# Patient Record
Sex: FEMALE | Race: BLACK OR AFRICAN AMERICAN | NOT HISPANIC OR LATINO | Employment: OTHER | URBAN - METROPOLITAN AREA
[De-identification: names, ages, dates, MRNs, and addresses within clinical notes are randomized per-mention and may not be internally consistent; named-entity substitution may affect disease eponyms.]

---

## 2017-12-29 ENCOUNTER — OFFICE VISIT (OUTPATIENT)
Dept: INTERNAL MEDICINE | Facility: CLINIC | Age: 72
End: 2017-12-29
Payer: COMMERCIAL

## 2017-12-29 VITALS
TEMPERATURE: 98 F | HEART RATE: 80 BPM | WEIGHT: 170.31 LBS | BODY MASS INDEX: 28.37 KG/M2 | OXYGEN SATURATION: 98 % | DIASTOLIC BLOOD PRESSURE: 72 MMHG | HEIGHT: 65 IN | RESPIRATION RATE: 16 BRPM | SYSTOLIC BLOOD PRESSURE: 138 MMHG

## 2017-12-29 DIAGNOSIS — E87.1 HYPONATREMIA: Primary | ICD-10-CM

## 2017-12-29 DIAGNOSIS — R25.2 CRAMP AND SPASM: ICD-10-CM

## 2017-12-29 PROCEDURE — 99999 PR PBB SHADOW E&M-NEW PATIENT-LVL V: CPT | Mod: PBBFAC,,, | Performed by: PHYSICIAN ASSISTANT

## 2017-12-29 PROCEDURE — 99203 OFFICE O/P NEW LOW 30 MIN: CPT | Mod: S$GLB,,, | Performed by: PHYSICIAN ASSISTANT

## 2017-12-29 RX ORDER — AMLODIPINE BESYLATE 5 MG/1
5 TABLET ORAL DAILY PRN
COMMUNITY
Start: 2017-10-02 | End: 2019-06-18

## 2017-12-29 RX ORDER — HYDROQUINONE 40 MG/G
1 CREAM TOPICAL
COMMUNITY
Start: 2017-10-02 | End: 2019-06-18

## 2017-12-29 RX ORDER — ASPIRIN 81 MG/1
81 TABLET ORAL DAILY
COMMUNITY

## 2017-12-29 RX ORDER — SPIRONOLACTONE 25 MG/1
1 TABLET ORAL DAILY PRN
COMMUNITY
Start: 2017-07-31 | End: 2018-02-19

## 2017-12-29 RX ORDER — CLONIDINE HYDROCHLORIDE 0.1 MG/1
0.1 TABLET ORAL 2 TIMES DAILY PRN
COMMUNITY
Start: 2017-05-10 | End: 2019-06-18

## 2017-12-29 RX ORDER — LISINOPRIL 10 MG/1
1 TABLET ORAL DAILY
COMMUNITY
Start: 2017-10-02 | End: 2019-06-18

## 2017-12-29 RX ORDER — LORAZEPAM 1 MG/1
0.5 TABLET ORAL 2 TIMES DAILY PRN
COMMUNITY
Start: 2017-12-26

## 2017-12-29 NOTE — PROGRESS NOTES
Subjective:       Patient ID: Susy Betts is a 72 y.o.B/ female.    Chief Complaint: Anorexia; Weight Loss; and Hair Loss    HPI         The patient is new to [facility] today and comes in by herself.  She has the above problem.  She's convinced that she has some form of [place]'s disease because she has all the symptoms ago along with it.  She has been seeing her own family practitioner Dr. Jay Guadarrama M.D. for about 15 years in [place].  I think he is finally told her that she really does not have a problem and she needs to get off the thought of having [place]'s.  She keeps complaining that her sodium has been chronically low and she brought in copies of her labs to show that it has been happening since 2014.  She also complains of little black freckles at her hairline on her forehead, chloasma on her cheeks, cramps that are occurring all over her body almost daily which include her mid finger on the right hand, her right side and flank, her right and left thigh, and her right calf.  She has also seen an endocrinologist and a nephrologist and got the same response as her family practitioner.  They all say she is trying to make something out a nothing.    Review of Systems    Otherwise negative concerning the ENDOCRINE, RENAL, PULMONARY, CV, VASCULAR, and GI system review.    Objective:      Physical Exam    She has extremely young-looking skin for her age on her face.  I don't really see any hyperpigmented freckles anywhere on her face but she is wearing heavy makeup.  She also doesn't show any signs of chloasma.  ENT: All within normal limits.  CHEST: Clear BS anterior to posterior.  She has no wheeze, rhonchi, or rales.  HEART: RSR.  Pulse rate is 78 bpm and regular.  S1 is greater than S2.  There is no murmur or gallop.  She doesn't have any peripheral edema.  EXTREMITIES: She has no muscle contraction deformities anywhere in her fingers, arms, or legs.    Assessment:       1. Hyponatremia    2. Cramp  and spasm        Plan:     1.  I put a lot of copies of recent labs into the EMR today.  Because of those studies that have recently been done, we don't need to repeat any them at this time.  2.  Refer to nephrology to see Dr. Carreno concerning her hyponatremia.

## 2018-02-19 ENCOUNTER — OFFICE VISIT (OUTPATIENT)
Dept: NEPHROLOGY | Facility: CLINIC | Age: 73
End: 2018-02-19
Payer: COMMERCIAL

## 2018-02-19 VITALS
HEART RATE: 82 BPM | DIASTOLIC BLOOD PRESSURE: 80 MMHG | BODY MASS INDEX: 28.65 KG/M2 | WEIGHT: 171.94 LBS | SYSTOLIC BLOOD PRESSURE: 140 MMHG | HEIGHT: 65 IN

## 2018-02-19 DIAGNOSIS — I10 ESSENTIAL HYPERTENSION: ICD-10-CM

## 2018-02-19 DIAGNOSIS — E83.52 HYPERCALCEMIA: Primary | ICD-10-CM

## 2018-02-19 DIAGNOSIS — E87.1 HYPONATREMIA: ICD-10-CM

## 2018-02-19 PROCEDURE — 3008F BODY MASS INDEX DOCD: CPT | Mod: S$GLB,,, | Performed by: INTERNAL MEDICINE

## 2018-02-19 PROCEDURE — 1125F AMNT PAIN NOTED PAIN PRSNT: CPT | Mod: S$GLB,,, | Performed by: INTERNAL MEDICINE

## 2018-02-19 PROCEDURE — 99204 OFFICE O/P NEW MOD 45 MIN: CPT | Mod: S$GLB,,, | Performed by: INTERNAL MEDICINE

## 2018-02-19 PROCEDURE — 1159F MED LIST DOCD IN RCRD: CPT | Mod: S$GLB,,, | Performed by: INTERNAL MEDICINE

## 2018-02-19 PROCEDURE — 99999 PR PBB SHADOW E&M-EST. PATIENT-LVL III: CPT | Mod: PBBFAC,,, | Performed by: INTERNAL MEDICINE

## 2018-02-19 PROCEDURE — 99354 PR PROLONGED SVC, OUPT, 1ST HR: CPT | Mod: S$GLB,,, | Performed by: INTERNAL MEDICINE

## 2018-02-19 NOTE — PROGRESS NOTES
Subjective:       Patient ID: Susy Betts is a 72 y.o. 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  Has been followed by endocrinology Dr. Reyes for possible [place]'s disease.  Patient has had a negative workup for adrenal gland abnormalities including cosyntropin test.  Patient also had metanephrines and other blood pressure workup done already including multiple scans of the body including renal ultrasound and MRI.    February 2018 patient comes in with 30 pound weight loss, history of acne, hair loss and has been using Aldactone for 5 years.  Patient has chronic hyponatremia with sodium ranging between 125 and 133.  Also has chronic hypercalcemia with calcium ranging between 10.4 and 10.9.    Review of Systems   Constitutional: Positive for unexpected weight change. Negative for activity change, appetite change, chills, diaphoresis, fatigue and fever.   HENT: Negative for congestion, dental problem, drooling, postnasal drip, rhinorrhea and voice change.    Eyes: Negative for discharge.   Respiratory: Negative for apnea, cough, choking, chest tightness, shortness of breath, wheezing and stridor.    Cardiovascular: Negative for chest pain, palpitations and leg swelling.   Gastrointestinal: Negative for abdominal distention, blood in stool, constipation, diarrhea, nausea, rectal pain and vomiting.   Endocrine: Negative for cold intolerance, heat intolerance, polydipsia and polyuria.   Genitourinary: Negative for decreased urine volume, difficulty urinating, dysuria, enuresis, flank pain, frequency, hematuria and urgency.   Musculoskeletal: Negative for arthralgias, back pain, gait problem and joint swelling.        Right knee pain   Skin: Negative for rash.   Allergic/Immunologic: Negative for food allergies and immunocompromised state.   Neurological: Negative for  "dizziness, tremors, syncope, numbness and headaches.   Hematological: Does not bruise/bleed easily.   Psychiatric/Behavioral: Negative for agitation, behavioral problems and self-injury. The patient is not nervous/anxious and is not hyperactive.    All other systems reviewed and are negative.      Objective:   BP (!) 140/80   Pulse 82   Ht 5' 5" (1.651 m)   Wt 78 kg (171 lb 15.3 oz)   BMI 28.62 kg/m²      Physical Exam   Constitutional: She is oriented to person, place, and time. No distress.   HENT:   Head: Normocephalic and atraumatic.   Nose: Nose normal.   Eyes: Conjunctivae and EOM are normal. Pupils are equal, round, and reactive to light.   Neck: Normal range of motion. No JVD present. No tracheal deviation present. No thyromegaly present.   Cardiovascular: Normal rate, regular rhythm, normal heart sounds and intact distal pulses.  Exam reveals no gallop and no friction rub.    No murmur heard.  Pulmonary/Chest: Effort normal and breath sounds normal. No respiratory distress. She has no wheezes. She has no rales. She exhibits no tenderness.   Abdominal: Soft. Bowel sounds are normal. She exhibits no distension and no mass. There is no tenderness. No hernia.   Musculoskeletal: She exhibits no edema, tenderness or deformity.        Right knee: She exhibits decreased range of motion and swelling.   Neurological: She is alert and oriented to person, place, and time. She has normal reflexes. She displays normal reflexes. No cranial nerve deficit. She exhibits normal muscle tone. Coordination normal.   Skin: Skin is warm. She is not diaphoretic. No erythema. There is pallor.   Psychiatric: She has a normal mood and affect. Her behavior is normal. Judgment and thought content normal.   Nursing note and vitals reviewed.          Assessment:    )    1. Hypercalcemia    2. Hyponatremia    3. Essential hypertension        Plan:         1.  Patient has hypercalcemia which is chronic: Differential diagnosis and workup " discussed in with the patient in great detail.  Algorithm also discussed and given a copy to the patient.  We'll check PTH, vitamin D level and serum protein at the pheresis and screen for multiple myeloma ensued and the fact the patient has weight loss in addition to chronic hypocalcemia.  Patient also has been running low blood pressures lately.    2.  Hyponatremia: I have advised the patient to stop the Aldactone and recheck sodium in a few weeks.  If the sodium is still low then we will start the workup.  I will get also given her literature search which shows case reports on Aldactone causing hyponatremia     3.  Hypertension: Considering the relatively low blood pressure avoid clonidine.  Avoid hypotension.  No evidence of secondary causes of hypertension.  Workup by Dr. Rohit Reyes in endocrinology reviewed in great detail.      Extended time was 30 minutes which was spent face-to-face with the patient .  Greater than 70% of the extended time was spent in discussing all of the complex medical problems   and discussing the therapeutic options regarding all of the above complex medical problems.  Follow-up one month

## 2018-02-19 NOTE — LETTER
February 19, 2018      Mark Anthony Hall PA-C  [address] [facility] [place] [address]           [facility] - Nephrology  [address] [facility] [place] [address]  Phone: [phone]  Fax: [phone]          Patient: Susy Betts   MR Number: [id number]   YOB: 1945   Date of Visit: 2/19/2018       Dear Mark Anthony Hall:    Thank you for referring Susy Betts to me for evaluation. Attached you will find relevant portions of my assessment and plan of care.    If you have questions, please do not hesitate to call me. I look forward to following Susy Betts along with you.    Sincerely,    Dennys Carreno MD    Enclosure  CC:  No Recipients    If you would like to receive this communication electronically, please contact [email or web address] or [phone] to request more information on [facility] access.    For providers and/or their staff who would like to refer a patient to [facility], please contact us through our one-stop-shop provider referral line, [facility], at [phone].    If you feel you have received this communication in error or would no longer like to receive these types of communications, please e-mail [email or web address]

## 2018-04-16 ENCOUNTER — TELEPHONE (OUTPATIENT)
Dept: NEPHROLOGY | Facility: CLINIC | Age: 73
End: 2018-04-16

## 2018-04-16 NOTE — TELEPHONE ENCOUNTER
----- Message from [place] Engle sent at 4/13/2018  4:23 PM CDT -----  Contact: Patient  Patient called to make sure her results for labs were received before her appointment on Tuesday. She can be contacted at [phone].    Thanks,  [place]

## 2018-04-17 ENCOUNTER — OFFICE VISIT (OUTPATIENT)
Dept: NEPHROLOGY | Facility: CLINIC | Age: 73
End: 2018-04-17
Payer: COMMERCIAL

## 2018-04-17 VITALS
WEIGHT: 173.06 LBS | SYSTOLIC BLOOD PRESSURE: 152 MMHG | HEART RATE: 72 BPM | HEIGHT: 65 IN | BODY MASS INDEX: 28.83 KG/M2 | DIASTOLIC BLOOD PRESSURE: 80 MMHG

## 2018-04-17 DIAGNOSIS — I10 ESSENTIAL HYPERTENSION: ICD-10-CM

## 2018-04-17 DIAGNOSIS — E83.52 HYPERCALCEMIA: Primary | ICD-10-CM

## 2018-04-17 DIAGNOSIS — E87.1 HYPONATREMIA: ICD-10-CM

## 2018-04-17 PROCEDURE — 99214 OFFICE O/P EST MOD 30 MIN: CPT | Mod: S$GLB,,, | Performed by: INTERNAL MEDICINE

## 2018-04-17 PROCEDURE — 99999 PR PBB SHADOW E&M-EST. PATIENT-LVL III: CPT | Mod: PBBFAC,,, | Performed by: INTERNAL MEDICINE

## 2018-04-17 NOTE — PROGRESS NOTES
Subjective:       Patient ID: Susy Betts is a 72 y.o. Black or  female who presents for new evaluation of Follow-up (hypercalcemia); Hypercalcemia; Hypertension; and hypoantremia    Hypertension   Pertinent negatives include no chest pain, headaches, palpitations or shortness of breath.        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  Has been followed by endocrinology Dr. Reyes for possible [place]'s disease.  Patient has had a negative workup for adrenal gland abnormalities including cosyntropin test.  Patient also had metanephrines and other blood pressure workup done already including multiple scans of the body including renal ultrasound and MRI.    February 2018 patient comes in with 30 pound weight loss, history of acne, hair loss and has been using Aldactone for 5 years.  Patient has chronic hyponatremia with sodium ranging between 125 and 133.  Also has chronic hypercalcemia with calcium ranging between 10.4 and 10.9.    4/2018 St=091 normal PTH , Ca and Vit D -spep ; [facility]    Review of Systems   Constitutional: Positive for unexpected weight change. Negative for activity change, appetite change, chills, diaphoresis, fatigue and fever.   HENT: Negative for congestion, dental problem, drooling, postnasal drip, rhinorrhea and voice change.    Eyes: Negative for discharge.   Respiratory: Negative for apnea, cough, choking, chest tightness, shortness of breath, wheezing and stridor.    Cardiovascular: Negative for chest pain, palpitations and leg swelling.   Gastrointestinal: Negative for abdominal distention, blood in stool, constipation, diarrhea, nausea, rectal pain and vomiting.   Endocrine: Negative for cold intolerance, heat intolerance, polydipsia and polyuria.   Genitourinary: Negative for decreased urine volume, difficulty urinating, dysuria, enuresis, flank pain, frequency, hematuria and urgency.   Musculoskeletal: Negative for arthralgias, back pain, gait  "problem and joint swelling.        Right knee pain   Skin: Negative for rash.   Allergic/Immunologic: Negative for food allergies and immunocompromised state.   Neurological: Negative for dizziness, tremors, syncope, numbness and headaches.   Hematological: Does not bruise/bleed easily.   Psychiatric/Behavioral: Negative for agitation, behavioral problems and self-injury. The patient is not nervous/anxious and is not hyperactive.    All other systems reviewed and are negative.      Objective:   BP (!) 152/80   Pulse 72   Ht 5' 5" (1.651 m)   Wt 78.5 kg (173 lb 1 oz)   BMI 28.80 kg/m²      Physical Exam   Constitutional: She is oriented to person, place, and time. No distress.   HENT:   Head: Normocephalic and atraumatic.   Nose: Nose normal.   Eyes: Conjunctivae and EOM are normal. Pupils are equal, round, and reactive to light.   Neck: Normal range of motion. No JVD present. No tracheal deviation present. No thyromegaly present.   Cardiovascular: Normal rate, regular rhythm, normal heart sounds and intact distal pulses.  Exam reveals no gallop and no friction rub.    No murmur heard.  Pulmonary/Chest: Effort normal and breath sounds normal. No respiratory distress. She has no wheezes. She has no rales. She exhibits no tenderness.   Abdominal: Soft. Bowel sounds are normal. She exhibits no distension and no mass. There is no tenderness. No hernia.   Musculoskeletal: She exhibits no edema, tenderness or deformity.        Right knee: She exhibits decreased range of motion and swelling.   Neurological: She is alert and oriented to person, place, and time. She has normal reflexes. She displays normal reflexes. No cranial nerve deficit. She exhibits normal muscle tone. Coordination normal.   Skin: Skin is warm. She is not diaphoretic. No erythema. There is pallor.   Psychiatric: She has a normal mood and affect. Her behavior is normal. Judgment and thought content normal.   Nursing note and vitals reviewed.    "       Assessment:    )    1. Hypercalcemia    2. Hyponatremia    3. Essential hypertension        Plan:         1.  Patient has hypercalcemia which is chronic: Differential diagnosis and workup discussed in with the patient in great detail.  Algorithm also discussed and given a copy to the patient.  Normal  PTH, normal vitamin D level and -shaniqua SPEP  and -shaniqua screen for multiple myeloma ensued and the fact the patient has weight loss in addition to chronic hypercalcemia.  Patient also has been running low blood pressures lately.    2.  Hyponatremia:Much better off  Aldactone   given her literature search which shows case reports on Aldactone causing hyponatremia. Consult derm/endo for alternates     3.  Hypertension: Considering the relatively low blood pressure avoid clonidine.  Avoid hypotension.  No evidence of secondary causes of hypertension.  Workup by Dr. Rohit Reyes in endocrinology reviewed in great detail.       follow up prn

## 2018-04-17 NOTE — PATIENT INSTRUCTIONS
1.  Patient has hypercalcemia which is chronic: Differential diagnosis and workup discussed in with the patient in great detail.  Algorithm also discussed and given a copy to the patient.  Normal  PTH, normal vitamin D level and -shaniqua SPEP  and -shaniqua screen for multiple myeloma ensued and the fact the patient has weight loss in addition to chronic hypercalcemia.  Patient also has been running low blood pressures lately.    2.  Hyponatremia:Much better off  Aldactone   given her literature search which shows case reports on Aldactone causing hyponatremia. For hair loss and facial issues may have to consult dermatology and endocrine to find alternates     3.  Hypertension: Considering the relatively low blood pressure avoid clonidine.  Avoid hypotension.  No evidence of secondary causes of hypertension.  Workup by Dr. Rohit Reyes in endocrinology reviewed in great detail.

## 2019-06-18 ENCOUNTER — OFFICE VISIT (OUTPATIENT)
Dept: NEUROLOGY | Facility: CLINIC | Age: 74
End: 2019-06-18
Payer: COMMERCIAL

## 2019-06-18 VITALS
HEART RATE: 68 BPM | WEIGHT: 139.75 LBS | SYSTOLIC BLOOD PRESSURE: 138 MMHG | DIASTOLIC BLOOD PRESSURE: 86 MMHG | HEIGHT: 67 IN | BODY MASS INDEX: 21.93 KG/M2

## 2019-06-18 DIAGNOSIS — G62.0 PERIPHERAL NEUROPATHY DUE TO CHEMOTHERAPY: ICD-10-CM

## 2019-06-18 DIAGNOSIS — T45.1X5A PERIPHERAL NEUROPATHY DUE TO CHEMOTHERAPY: ICD-10-CM

## 2019-06-18 PROCEDURE — 99999 PR PBB SHADOW E&M-EST. PATIENT-LVL III: CPT | Mod: PBBFAC,,, | Performed by: PSYCHIATRY & NEUROLOGY

## 2019-06-18 PROCEDURE — 99204 PR OFFICE/OUTPT VISIT, NEW, LEVL IV, 45-59 MIN: ICD-10-PCS | Mod: S$GLB,,, | Performed by: PSYCHIATRY & NEUROLOGY

## 2019-06-18 PROCEDURE — 99999 PR PBB SHADOW E&M-EST. PATIENT-LVL III: ICD-10-PCS | Mod: PBBFAC,,, | Performed by: PSYCHIATRY & NEUROLOGY

## 2019-06-18 PROCEDURE — 99204 OFFICE O/P NEW MOD 45 MIN: CPT | Mod: S$GLB,,, | Performed by: PSYCHIATRY & NEUROLOGY

## 2019-06-18 RX ORDER — GABAPENTIN 300 MG/1
300 CAPSULE ORAL
COMMUNITY
Start: 2019-06-03 | End: 2019-07-03

## 2019-06-18 RX ORDER — DULOXETIN HYDROCHLORIDE 60 MG/1
60 CAPSULE, DELAYED RELEASE ORAL
COMMUNITY
Start: 2019-05-20 | End: 2020-05-19

## 2019-06-18 NOTE — LETTER
June 18, 2019      Reymundo Doherty MD  [address] [facility] [place] [facility] [address]           [facility] - Neurology  [id number] [place] [address]  Phone: [phone]  Fax: [phone]          Patient: Susy Betts   MR Number: [id number]   YOB: 1945   Date of Visit: 6/18/2019       Dear Dr. Reymundo Doherty:    Thank you for referring Susy Betts to me for evaluation. Attached you will find relevant portions of my assessment and plan of care.    If you have questions, please do not hesitate to call me. I look forward to following Susy Betts along with you.    Sincerely,    Robert Thompson MD    Enclosure  CC:  No Recipients    If you would like to receive this communication electronically, please contact [email or web address] or [phone] to request more information on [facility] access.    For providers and/or their staff who would like to refer a patient to [facility], please contact us through our one-stop-shop provider referral line, [facility], at [phone].    If you feel you have received this communication in error or would no longer like to receive these types of communications, please e-mail [email or web address]

## 2019-06-18 NOTE — PROGRESS NOTES
Subjective:    I have seen and examined the patient and reviewed pertinent lab and imaging studies.  I have reviewed the patient's medical issues and defer to the primary care provider for the management of the other medical issues.  Informant:  Patient and patient's daughter  Patient ID: Susy Betts is a 73 y.o. female.    Chief Complaint: I have neuropathy after chemotherapy     This right-handed patient indicates that in July, 2018 she had a diagnosis of breast cancer established.  The patient states that in September, 2018 she started chemotherapy but then had an episode of right posterior thorax shingles.  The chemotherapy was discontinued.  In November, 2018, she had a modified left radical mastectomy and following that surgical procedure was placed on Taxol chemotherapy.  Patient states that she receive 10 of 12 treatments but could not complete the last 2 because of severe generalized weakness and significant weight loss.  The patient states that her last chemotherapy treatment was in February, 2019.    In March, 2019, she became aware of a sensation of numbness tingling and pain in the soles of both feet as well as difficulty with walking and standing balance that required finally a Rollator for safe ambulation.  The patient states that she has had a slight sensation of numbness in the fingertips but this has not been painful to her.    To treat the symptoms of her painful neuropathy, she has been placed on gabapentin 300 mg in the morning and 600 mg at bedtime along with Cymbalta 60 mg once a day.  The patient states that this combination of medications has relieved her pain but she still has a sensation of numbness and tingling.  She also continues to have significant difficulties with her walking and standing balance.  However, the patient is of the opinion that her neuropathic symptoms do not appear to be progressive.  The patient and her daughter present today with multiple questions about  neuropathy.          ROS:  GENERAL: NO FEVER, CHILLS,  OR NIGHT SWEATS.  THE PATIENT STATES THAT SHE HAS RECENTLY GAINED ABOUT 4 LB.  SKIN: NO RASHES, ITCHING OR CHANGES IN COLOR OR TEXTURE OF SKIN.  HEAD: NO HEADACHES OR RECENT HEAD TRAUMA.  EYES: VISUAL ACUITY FINE. NO PHOTOPHOBIA, OCULAR PAIN OR DIPLOPIA.  EARS: DENIES EAR PAIN, DISCHARGE OR VERTIGO.  NOSE: NO LOSS OF SMELL, NO EPISTAXIS OR POSTNASAL DRIP.  MOUTH & THROAT: NO HOARSENESS OR CHANGE IN VOICE. NO EXCESSIVE GUM BLEEDING.  NODES: DENIES SWOLLEN GLANDS.  CHEST: DENIES VOGT, CYANOSIS, WHEEZING, COUGH AND SPUTUM PRODUCTION.  CARDIOVASCULAR: DENIES CHEST PAIN, PND, ORTHOPNEA   ABDOMEN: APPETITE FINE. NO WEIGHT LOSS. DENIES DIARRHEA, ABDOMINAL PAIN, HEMATEMESIS OR BLOOD IN STOOL.  URINARY: NO FLANK PAIN, DYSURIA OR HEMATURIA.  PERIPHERAL VASCULAR: NO CLAUDICATION OR CYANOSIS.  MUSCULOSKELETAL: NO JOINT STIFFNESS OR SWELLING. DENIES BACK PAIN.  NEUROLOGIC: NO HISTORY OF SEIZURES, PARALYSIS,   OR UNEXPLAINED LOSS OF CONSCIOUSNESS      Past Medical History:   Diagnosis Date    HTN (hypertension)     Hyponatremia      Past Surgical History:   Procedure Laterality Date    VAGINAL DELIVERY      X 1    WISDOM TOOTH EXTRACTION      MODIFIED LEFT RADICAL MASTECTOMY  Family History   Problem Relation Age of Onset    Hypertension Mother     Heart attack Mother     Hypertension Father     Heart attack Father         Multiple     Social History     Socioeconomic History    Marital status:      Spouse name: Not on file    Number of children: Not on file    Years of education: Not on file    Highest education level: Not on file   Occupational History    Not on file   Social Needs    Financial resource strain: Not on file    Food insecurity:     Worry: Not on file     Inability: Not on file    Transportation needs:     Medical: Not on file     Non-medical: Not on file   Tobacco Use    Smoking status: Never Smoker    Smokeless tobacco: Never Used    Substance and Sexual Activity    Alcohol use: No    Drug use: No    Sexual activity: Not on file   Lifestyle    Physical activity:     Days per week: Not on file     Minutes per session: Not on file    Stress: Not on file   Relationships    Social connections:     Talks on phone: Not on file     Gets together: Not on file     Attends Druze service: Not on file     Active member of club or organization: Not on file     Attends meetings of clubs or organizations: Not on file     Relationship status: Not on file   Other Topics Concern    Not on file   Social History Narrative    Not on file         Objective:   PE:   VITAL SIGNS:  HEIGHT 5 FT 7 IN, WEIGHT 63.4 KG, BMI 21.89  Vitals:    06/18/19 0844   BP: 138/86   Pulse: 68     APPEARANCE: WELL NOURISHED, WELL DEVELOPED, IN NO ACUTE DISTRESS.    HEAD: NORMOCEPHALIC, ATRAUMATIC.  EYES: PERRL. EOMI.  NON-ICTERIC SCLERAE.    EARS: TM'S INTACT. LIGHT REFLEX NORMAL.     NOSE: MUCOSA PINK. AIRWAY CLEAR.  MOUTH & THROAT: NO TONSILLAR ENLARGEMENT. NO PHARYNGEAL ERYTHEMA OR EXUDATE.  NECK: SUPPLE. NO BRUITS.  CHEST: LUNGS CLEAR TO AUSCULTATION.  CARDIOVASCULAR: REGULAR RHYTHM WITHOUT SIGNIFICANT MURMURS.  MUSCULOSKELETAL:  NO BONY DEFORMITY SEEN.  MUSCLE TONE  AND MUSCLE MASS ARE NORMAL IN BOTH UPPER AND BOTH LOWER EXTREMITIES    NEUROLOGIC:   MENTAL STATUS:  THE PATIENT IS WELL ORIENTED TO PERSON, TIME, PLACE, AND SITUATION.  THE PATIENT IS ATTENTIVE TO THE ENVIRONMENT AND COOPERATIVE FOR THE EXAM.  CRANIAL NERVES: II-XII GROSSLY INTACT. FUNDOSCOPIC EXAM IS NORMAL.  NO HEMORRHAGE, EXUDATE OR PAPILLEDEMA IS PRESENT. THE EXTRAOCULAR MUSCLES ARE INTACT IN THE CARDINAL DIRECTIONS OF GAZE.  NO PTOSIS IS PRESENT. FACIAL FEATURES ARE SYMMETRICAL.  SPEECH IS NORMAL IN FLUENCY, DICTION, AND PHRASING.  TONGUE PROTRUDES IN THE MIDLINE.    GAIT AND STATION:   ROMBERG IS POSITIVE.  THE PATIENT IS ABLE TO AMBULATE WITH A ROLLATOR AND HANDLES A ROLLATOR INDEPENDENTLY.  MOTOR:   NO DOWNDRIFT OF EITHER ARM WHEN HELD AT SHOULDER LEVEL.  MANUAL MUSCLE TESTING OF PROXIMAL AND DISTAL MUSCLES OF BOTH UPPER AND LOWER EXTREMITIES DOES DEMONSTRATE SIGNIFICANT WEAKNESS OF DORSIFLEXION OF THE ANKLE AND GREAT TOE BILATERALLY.  PLANTAR FLEXION IS 4/5.  I DID NOT DETECT ANY WEAKNESS OF THE UPPER EXTREMITIES.   SENSORY:    THE PATIENT HAS ABSENT VIBRATORY SENSATION AND POSITION SENSE IN BOTH FEET AND ANKLES.  VIBRATORY SENSATION IS INTACT IN THE FINGERTIPS.  THE PATIENT HAS DIMINISHED APPRECIATION OF LIGHT TOUCH DISTALLY OVER ALL 5 TOES OF BOTH FEET TO THE MID CALF LEVEL.  SHE IS ABLE TO PERCEIVE PINPRICK AND DESCRIBES IT AS IRRITATING.  CEREBELLAR:  FINGER TO NOSE DONE WELL.  ALTERNATING MOVEMENTS INTACT.  NO INVOLUNTARY MOVEMENTS OR TREMOR SEEN.  REFLEXES:  STRETCH REFLEXES ARE   ABSENT BOTH UPPER AND LOWER EXTREMITIES.  PLANTAR STIMULATION IS FLEXOR BILATERALLY AND NO PATHOLOGICAL REFLEXES ARE SEEN              Assessment:     Encounter Diagnosis   Name Primary?    Peripheral neuropathy due to chemotherapy     HISTORY OF BREAST CANCER     Assessment:  The patient clearly has the findings on physical examination of a distal symmetrical sensory and motor neuropathy. The etiology of the neuropathy would appear to be the chemotherapy that was utilized for her breast malignancy.      Plan:     1. This was a prolonged visit of 60 min duration during which time the patient and her daughter had multiple questions regarding the diagnosis of polyneuropathy and its association with chemotherapy.  All questions were answered to the patient's satisfaction.  It was recommended to the patient that she continue gabapentin and Cymbalta for symptomatic management of the neuropathic pain.  2.  The patient is to return to Neurology as needed.    This note is generated with speech recognition software and is subject to transcription error and sound alike phrases that may be missed by proofreading.